# Patient Record
Sex: MALE | Race: WHITE | Employment: FULL TIME | URBAN - NONMETROPOLITAN AREA
[De-identification: names, ages, dates, MRNs, and addresses within clinical notes are randomized per-mention and may not be internally consistent; named-entity substitution may affect disease eponyms.]

---
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
## 2021-04-12 ENCOUNTER — HOSPITAL ENCOUNTER (OUTPATIENT)
Dept: GENERAL RADIOLOGY | Age: 21
Discharge: HOME | End: 2021-04-12
Payer: COMMERCIAL

## 2021-04-12 ENCOUNTER — HOSPITAL ENCOUNTER (OUTPATIENT)
Dept: MRI IMAGING | Age: 21
Discharge: HOME | End: 2021-04-12
Payer: COMMERCIAL

## 2021-04-12 DIAGNOSIS — M25.311 INSTABILITY OF RIGHT SHOULDER JOINT: ICD-10-CM

## 2021-04-12 PROCEDURE — 73222 MRI JOINT UPR EXTREM W/DYE: CPT

## 2021-04-12 PROCEDURE — 23350 INJECTION FOR SHOULDER X-RAY: CPT

## 2021-04-12 PROCEDURE — 6360000004 HC RX CONTRAST MEDICATION: Performed by: ORTHOPAEDIC SURGERY

## 2021-04-12 PROCEDURE — A9579 GAD-BASE MR CONTRAST NOS,1ML: HCPCS | Performed by: ORTHOPAEDIC SURGERY

## 2021-04-12 RX ADMIN — IOTHALAMATE MEGLUMINE 5 ML: 600 INJECTION INTRAVASCULAR at 09:34

## 2021-04-12 RX ADMIN — GADOTERIDOL 1 ML: 279.3 INJECTION, SOLUTION INTRAVENOUS at 10:51

## 2021-06-20 ENCOUNTER — HOSPITAL ENCOUNTER (EMERGENCY)
Age: 21
Discharge: HOME | End: 2021-06-20
Attending: EMERGENCY MEDICINE
Payer: COMMERCIAL

## 2021-06-20 ENCOUNTER — APPOINTMENT (OUTPATIENT)
Dept: GENERAL RADIOLOGY | Age: 21
End: 2021-06-20
Payer: COMMERCIAL

## 2021-06-20 VITALS
SYSTOLIC BLOOD PRESSURE: 103 MMHG | TEMPERATURE: 98 F | DIASTOLIC BLOOD PRESSURE: 65 MMHG | BODY MASS INDEX: 30.06 KG/M2 | HEART RATE: 75 BPM | OXYGEN SATURATION: 98 % | WEIGHT: 210 LBS | RESPIRATION RATE: 18 BRPM | HEIGHT: 70 IN

## 2021-06-20 DIAGNOSIS — S43.004A DISLOCATION OF RIGHT SHOULDER JOINT, INITIAL ENCOUNTER: Primary | ICD-10-CM

## 2021-06-20 PROCEDURE — 6360000002 HC RX W HCPCS: Performed by: PHYSICIAN ASSISTANT

## 2021-06-20 PROCEDURE — 96375 TX/PRO/DX INJ NEW DRUG ADDON: CPT

## 2021-06-20 PROCEDURE — 6360000002 HC RX W HCPCS

## 2021-06-20 PROCEDURE — 73030 X-RAY EXAM OF SHOULDER: CPT

## 2021-06-20 PROCEDURE — 23655 CLTX SHO DSLC W/MNPJ W/ANES: CPT

## 2021-06-20 PROCEDURE — 96374 THER/PROPH/DIAG INJ IV PUSH: CPT

## 2021-06-20 PROCEDURE — 99284 EMERGENCY DEPT VISIT MOD MDM: CPT

## 2021-06-20 RX ORDER — TRAMADOL HYDROCHLORIDE 50 MG/1
50 TABLET ORAL EVERY 6 HOURS PRN
Qty: 15 TABLET | Refills: 0 | Status: SHIPPED | OUTPATIENT
Start: 2021-06-20 | End: 2021-06-25

## 2021-06-20 RX ORDER — ONDANSETRON 2 MG/ML
4 INJECTION INTRAMUSCULAR; INTRAVENOUS ONCE
Status: COMPLETED | OUTPATIENT
Start: 2021-06-20 | End: 2021-06-20

## 2021-06-20 RX ORDER — PROPOFOL 10 MG/ML
INJECTION, EMULSION INTRAVENOUS
Status: COMPLETED
Start: 2021-06-20 | End: 2021-06-20

## 2021-06-20 RX ORDER — PROPOFOL 10 MG/ML
1 INJECTION, EMULSION INTRAVENOUS ONCE
Status: COMPLETED | OUTPATIENT
Start: 2021-06-20 | End: 2021-06-20

## 2021-06-20 RX ADMIN — PROPOFOL 95 MG: 10 INJECTION, EMULSION INTRAVENOUS at 02:08

## 2021-06-20 RX ADMIN — ONDANSETRON 4 MG: 2 INJECTION INTRAMUSCULAR; INTRAVENOUS at 01:09

## 2021-06-20 RX ADMIN — HYDROMORPHONE HYDROCHLORIDE 1 MG: 1 INJECTION, SOLUTION INTRAMUSCULAR; INTRAVENOUS; SUBCUTANEOUS at 01:09

## 2021-06-20 ASSESSMENT — PAIN SCALES - GENERAL
PAINLEVEL_OUTOF10: 5
PAINLEVEL_OUTOF10: 6

## 2021-06-20 ASSESSMENT — PAIN DESCRIPTION - ORIENTATION: ORIENTATION: RIGHT

## 2021-06-20 ASSESSMENT — PAIN DESCRIPTION - LOCATION: LOCATION: SHOULDER

## 2021-06-20 NOTE — ED PROVIDER NOTES
98 °F (36.7 °C). His blood pressure is 103/65 and his pulse is 75. His respiration is 18 and oxygen saturation is 98%. Physical Exam  Vitals and nursing note reviewed. Constitutional:       General: He is not in acute distress. Appearance: He is well-developed. He is not toxic-appearing or diaphoretic. HENT:      Head: Normocephalic and atraumatic. Right Ear: Hearing normal.      Left Ear: Hearing normal.      Nose: Nose normal.   Eyes:      General: Lids are normal.      Conjunctiva/sclera: Conjunctivae normal.   Neck:      Trachea: No tracheal deviation. Cardiovascular:      Rate and Rhythm: Normal rate and regular rhythm. Pulses:           Radial pulses are 2+ on the right side and 2+ on the left side. Pulmonary:      Effort: Pulmonary effort is normal. No tachypnea or respiratory distress. Breath sounds: No stridor. Abdominal:      General: There is no distension. Palpations: Abdomen is soft. Musculoskeletal:      Right shoulder: Deformity, tenderness and bony tenderness present. Decreased range of motion. Normal pulse. Cervical back: No rigidity. Skin:     General: Skin is warm and dry. Coloration: Skin is not pale. Findings: No abrasion, ecchymosis or rash. Neurological:      Mental Status: He is alert and oriented to person, place, and time. GCS: GCS eye subscore is 4. GCS verbal subscore is 5. GCS motor subscore is 6. Sensory: No sensory deficit. Gait: Gait normal.   Psychiatric:         Speech: Speech normal.         Behavior: Behavior normal.         Thought Content: Thought content normal.         DIFFERENTIAL DIAGNOSIS:   Including but not limited to:  Shoulder dislocation, fracture, separation    DIAGNOSTIC RESULTS     EKG: All [facility]'s [facility] who either signs or Co-signs this chart in the absence of a cardiologist.  None    RADIOLOGY: non-plain film images(s) such as CT,Ultrasound and MRI are read by the radiologist.  Plain radiographic images are visualized and preliminarily interpreted by the emergency physician unless otherwise stated below. XR SHOULDER RIGHT (MIN 2 VIEWS)   Final Result   Post reduction films demonstrate near-anatomic alignment of the right    humeral head in relation to the glenoid fossa. No fractures. This document has been electronically signed by: Ike Castillo DO on    06/20/2021 02:57 AM      XR SHOULDER RIGHT (MIN 2 VIEWS)   Final Result   Anterior inferior dislocation of the right humeral head. Nadyne Tigist Possible    Hill-Sachs deformity of the right humeral head. This document has been electronically signed by: Ike Castlilo DO on    06/20/2021 12:57 AM          LABS:   Labs Reviewed - No data to display    EMERGENCY DEPARTMENT COURSE:   Vitals:    Vitals:    06/20/21 0209 06/20/21 0212 06/20/21 0215 06/20/21 0220   BP: 118/84 110/63  103/65   Pulse: 69 72 70 75   Resp: 16 18 18 18   Temp:       TempSrc:       SpO2: 100% 97% 98% 98%   Weight:       Height:         MDM:  The patient was seen by me in the emergency department for shoulder dislocation. Physical exam revealed clinical anterior dislocation of the right shoulder. The patient was neurovascularly intact. Vital signs reviewed and noted stable. Old records were reviewed including MRI in April revealing labrum tear and Hill-Sachs deformity. Patient was medicated with Dilaudid and Zofran. I was unable to get him relaxed enough with IV medications to reduce the shoulder so conscious sedation was discussed. After myself and Dr. Padilla Class discussed risks and benefits of conscious sedation patient wished to proceed. Please refer to conscious sedation note. Patient tolerated the procedure well and shoulder dislocation was reduced. This was confirmed with x-rays. Patient recovered from conscious sedation without complication. Sling and swath was applied.   Patient was comfortable plan of discharge home and to follow-up at [facility]. I have given the patient strict written and verbal instructions about care at home, follow-up, and signs and symptoms of worsening of condition and they did verbalize understanding. CRITICAL CARE:   None    CONSULTS:  None    PROCEDURES:  Ortho Injury    Date/Time: 6/20/2021 2:00 AM  Performed by: Kathleen Rashid PA-C  Authorized by: Malcolm Nguyen MD   Injury location: shoulder  Location details: right shoulder  Injury type: dislocation  Dislocation type: anterior  Hill-Sachs deformity: yes  Chronicity: recurrent  Pre-procedure neurovascular assessment: neurovascularly intact  Pre-procedure distal perfusion: normal  Pre-procedure neurological function: normal  Pre-procedure range of motion: reduced    Anesthesia:  Local anesthesia used: no    Sedation:  Patient sedated: yes  Sedation type: moderate (conscious) sedation  Sedatives: propofol and see MAR for details  Analgesia: hydromorphone  Sedation start date/time: 6/20/2021 2:08 AM  Sedation end date/time: 6/20/2021 2:20 AM    Manipulation performed: yes  Reduction method: traction and counter traction  Reduction successful: yes  X-ray confirmed reduction: yes  Immobilization: sling (Sling and swath)  Post-procedure neurovascular assessment: post-procedure neurovascularly intact  Post-procedure distal perfusion: normal  Post-procedure neurological function: normal  Post-procedure range of motion: normal  Patient tolerance: patient tolerated the procedure well with no immediate complications  Comments: Conscious sedation and reduction performed by Dr. Shayy Carrington      1. Dislocation of right shoulder joint, initial encounter          DISPOSITION/PLAN     1.  Dislocation of right shoulder joint, initial encounter        PATIENT REFERRED TO:  Perfecto Yi [address] [place] [address]  [phone]  Schedule an appointment as soon as possible for a visit         DISCHARGE MEDICATIONS: Discharge Medication List as of 6/20/2021  2:39 AM      START taking these medications    Details   traMADol (ULTRAM) 50 MG tablet Take 1 tablet by mouth every 6 hours as needed for Pain for up to 5 days. , Disp-15 tablet, R-0Print             (Please note that portions of this note were completed with a voice recognition program.  Efforts were made to edit the dictations but occasionally words are mis-transcribed.)    Paul Youngblood PA-C 06/22/21 3:46 AM    TYLER Abrams Ace, PA-C  06/22/21 1980

## 2021-06-20 NOTE — PROGRESS NOTES
This RCP at bedside for conscious sedation. Patient SpO2 100% on 3Lpm nasal cannula, EtCO2 41, RR 20/min. Ambu bag prepared.

## 2021-06-20 NOTE — ED TRIAGE NOTES
Pt to ED through triage with c/c right shoulder pain. Pt states his shoulder frequently pops out of place. Pt states he was scratching his back about an hour ago when he felt it slide out of place. Pt rates pain 5/10 at this time. Vitals stable.

## 2021-06-20 NOTE — ED NOTES
Consent form signed by pt at this time.  Benefits and risks reviewed by [facility]     Melita Bonilla RN  06/20/21 8601

## 2021-06-20 NOTE — ED PROVIDER NOTES
Procedural sedation    Date/Time: 6/20/2021 2:54 AM  Performed by: Abisai Valdez MD  Authorized by: Abisai Valdez MD     Consent:     Consent obtained:  Written    Consent given by:  Patient    Risks discussed:  Dysrhythmia, nausea, prolonged hypoxia resulting in organ damage, respiratory compromise necessitating ventilatory assistance and intubation and vomiting  Indications:     Procedure performed:  Dislocation reduction    Procedure necessitating sedation performed by:  Physician performing sedation    Intended level of sedation:  Moderate (conscious sedation)  Pre-sedation assessment:     ASA classification: class 1 - normal, healthy patient      Neck mobility: normal      Mouth opening:  3 or more finger widths    Thyromental distance:  3 finger widths    Mallampati score:  I - soft palate, uvula, fauces, pillars visible    Pre-sedation assessments completed and reviewed: airway patency, cardiovascular function and respiratory function      History of difficult intubation: no    Immediate pre-procedure details:     Reviewed: vital signs      Verified: bag valve mask available, emergency equipment available, intubation equipment available, IV patency confirmed, oxygen available and suction available    Procedure details (see MAR for exact dosages):     Preoxygenation:  Nasal cannula    Sedation:  Propofol    Intra-procedure monitoring:  Blood pressure monitoring, cardiac monitor, continuous capnometry, continuous pulse oximetry, frequent LOC assessments and frequent vital sign checks    Intra-procedure events: none    Post-procedure details:     Attendance: Constant attendance by certified staff until patient recovered      Complications:  None    Post-sedation assessments completed and reviewed: airway patency, mental status and respiratory function    Comments: Tolerated conscious sedation without complication.            Abisai Valdez MD  06/20/21 1225

## 2021-06-20 NOTE — ED NOTES
Dr. Neeraj Piper believes that shoulder is successfully reduced.  XR ordered to confirm     Malissa Cardona RN  06/20/21 9247

## 2021-06-22 ASSESSMENT — ENCOUNTER SYMPTOMS
BACK PAIN: 0
SHORTNESS OF BREATH: 0
PHOTOPHOBIA: 0
RHINORRHEA: 0
NAUSEA: 0
VOMITING: 0

## 2022-01-15 ENCOUNTER — HOSPITAL ENCOUNTER (EMERGENCY)
Age: 22
Discharge: HOME | End: 2022-01-15
Payer: COMMERCIAL

## 2022-01-15 VITALS
SYSTOLIC BLOOD PRESSURE: 136 MMHG | TEMPERATURE: 98.7 F | HEIGHT: 70 IN | RESPIRATION RATE: 18 BRPM | WEIGHT: 220 LBS | DIASTOLIC BLOOD PRESSURE: 71 MMHG | HEART RATE: 97 BPM | OXYGEN SATURATION: 97 % | BODY MASS INDEX: 31.5 KG/M2

## 2022-01-15 DIAGNOSIS — U07.1 COVID-19: Primary | ICD-10-CM

## 2022-01-15 LAB — SARS-COV-2, NAA: DETECTED

## 2022-01-15 PROCEDURE — G0463 HOSPITAL OUTPT CLINIC VISIT: HCPCS

## 2022-01-15 PROCEDURE — 87635 SARS-COV-2 COVID-19 AMP PRB: CPT

## 2022-01-15 PROCEDURE — 99213 OFFICE O/P EST LOW 20 MIN: CPT

## 2022-01-15 PROCEDURE — 99213 OFFICE O/P EST LOW 20 MIN: CPT | Performed by: NURSE PRACTITIONER

## 2022-01-15 ASSESSMENT — PAIN DESCRIPTION - PAIN TYPE: TYPE: ACUTE PAIN

## 2022-01-15 ASSESSMENT — ENCOUNTER SYMPTOMS
SORE THROAT: 1
EYE DISCHARGE: 0
WHEEZING: 0
NAUSEA: 0
DIARRHEA: 0
EYE REDNESS: 0
VOMITING: 0
COUGH: 1
SHORTNESS OF BREATH: 0
ALLERGIC/IMMUNOLOGIC NEGATIVE: 1
BACK PAIN: 0
TROUBLE SWALLOWING: 0
RHINORRHEA: 0
EYE PAIN: 0
CONSTIPATION: 0
ABDOMINAL PAIN: 0

## 2022-01-15 ASSESSMENT — PAIN DESCRIPTION - PROGRESSION: CLINICAL_PROGRESSION: GRADUALLY WORSENING

## 2022-01-15 ASSESSMENT — PAIN DESCRIPTION - ONSET: ONSET: GRADUAL

## 2022-01-15 ASSESSMENT — PAIN DESCRIPTION - DESCRIPTORS: DESCRIPTORS: ACHING;SORE

## 2022-01-15 ASSESSMENT — PAIN - FUNCTIONAL ASSESSMENT: PAIN_FUNCTIONAL_ASSESSMENT: PREVENTS OR INTERFERES WITH MANY ACTIVE NOT PASSIVE ACTIVITIES

## 2022-01-15 ASSESSMENT — PAIN SCALES - GENERAL: PAINLEVEL_OUTOF10: 5

## 2022-01-15 ASSESSMENT — PAIN DESCRIPTION - LOCATION: LOCATION: GENERALIZED;THROAT

## 2022-01-15 NOTE — ED NOTES
Covid nasal pharyngeal swab obtained and sent to lab. Pt tolerated well.      Yeny Alexander RN  01/15/22 8987

## 2022-01-15 NOTE — Clinical Note
Fanta Dino was seen and treated in our emergency department on 1/15/2022. Patient was seen in our urgent care today and was diagnosed with a COVID-19 virus infection.     JOSE MANUEL Diaz - CNP
1.71

## 2022-01-16 NOTE — ED NOTES
No change in patients condition. Discharge instructions discussed with pt and pt verbalized understanding of info given. Pt left stable and ambulated to exit.        Laquita Bloom RN  01/15/22 1912

## 2022-01-16 NOTE — ED PROVIDER NOTES
[address]  Urgent Care Encounter      CHIEF COMPLAINT       Chief Complaint   Patient presents with    Concern For COVID-19     EXPOSED AND SX STARTED 2 DAYS AGO        Nurses Notes reviewed and I agree except as noted in the HPI. HISTORY OF PRESENT ILLNESS   Darek Moseley is a 24 y.o. male who presents with 2 days of upper respiratory infection symptoms after being exposed directly to Leana in his house. REVIEW OF SYSTEMS     Review of Systems   Constitutional: Positive for fatigue. Negative for activity change and fever. HENT: Positive for congestion and sore throat. Negative for ear pain, rhinorrhea and trouble swallowing. Eyes: Negative for pain, discharge and redness. Respiratory: Positive for cough. Negative for shortness of breath and wheezing. Cardiovascular: Negative. Gastrointestinal: Negative for abdominal pain, constipation, diarrhea, nausea and vomiting. Endocrine: Negative. Genitourinary: Negative for dysuria, frequency and urgency. Musculoskeletal: Positive for myalgias. Negative for arthralgias and back pain. Skin: Negative for rash. Allergic/Immunologic: Negative. Neurological: Positive for headaches. Negative for dizziness, tremors and weakness. Hematological: Negative. Psychiatric/Behavioral: Negative for dysphoric mood and sleep disturbance. The patient is not nervous/anxious. PAST MEDICAL HISTORY   History reviewed. No pertinent past medical history. SURGICAL HISTORY     Patient  has a past surgical history that includes Eye surgery and hernia repair (Left, 1/18/2019). CURRENT MEDICATIONS       There are no discharge medications for this patient. ALLERGIES     Patient is has No Known Allergies. FAMILY HISTORY     Patient'sfamily history includes No Known Problems in his father and mother. SOCIAL HISTORY     Patient  reports that he is a non-smoker but has been exposed to tobacco smoke.  He has never used smokeless tobacco. He reports that he does not drink alcohol and does not use drugs. PHYSICAL EXAM     ED TRIAGE VITALS  BP: 136/71, Temp: 98.7 °F (37.1 °C), Pulse: 97, Resp: 18, SpO2: 97 %  Physical Exam  Vitals reviewed. Constitutional:       General: He is not in acute distress. Appearance: Normal appearance. He is well-developed. He is ill-appearing. He is not toxic-appearing or diaphoretic. HENT:      Head: Normocephalic. No right periorbital erythema or left periorbital erythema. Jaw: No trismus. Right Ear: Hearing, tympanic membrane, ear canal and external ear normal.      Left Ear: Hearing, tympanic membrane, ear canal and external ear normal.      Nose: Congestion present. No mucosal edema or rhinorrhea. Mouth/Throat:      Mouth: Mucous membranes are moist.      Dentition: Normal dentition. Pharynx: Uvula midline. No posterior oropharyngeal erythema. Tonsils: No tonsillar exudate. 0 on the right. 0 on the left. Eyes:      General: Lids are normal.         Right eye: No discharge. Left eye: No discharge. Conjunctiva/sclera: Conjunctivae normal.      Right eye: Right conjunctiva is not injected. No chemosis. Left eye: No chemosis. Pupils: Pupils are equal, round, and reactive to light. Neck:      Vascular: No JVD. Trachea: Trachea normal.   Cardiovascular:      Rate and Rhythm: Normal rate and regular rhythm. Heart sounds: Normal heart sounds. No murmur heard. Pulmonary:      Effort: Pulmonary effort is normal. No respiratory distress. Breath sounds: Normal breath sounds. No stridor. No wheezing. Musculoskeletal:         General: No tenderness or signs of injury. Normal range of motion. Cervical back: Full passive range of motion without pain and normal range of motion. Lymphadenopathy:      Cervical: No cervical adenopathy. Skin:     General: Skin is warm and dry.       Capillary Refill: Capillary refill takes less than 2 seconds. Findings: No rash. Neurological:      Mental Status: He is alert and oriented to person, place, and time. GCS: GCS eye subscore is 4. GCS verbal subscore is 5. GCS motor subscore is 6. Cranial Nerves: No cranial nerve deficit. Coordination: Coordination normal.      Gait: Gait normal.   Psychiatric:         Mood and Affect: Mood is not anxious or depressed. Speech: Speech normal.         Behavior: Behavior normal. Behavior is not withdrawn or hyperactive. Behavior is cooperative. Thought Content: Thought content normal.         Judgment: Judgment normal.         DIAGNOSTIC RESULTS   Labs:   Results for orders placed or performed during the hospital encounter of 01/15/22   COVID-19, Rapid   Result Value Ref Range    SARS-CoV-2, LUCA DETECTED (AA) NOT DETECTED       IMAGING:    URGENT CARE COURSE:     Vitals:    01/15/22 1839   BP: 136/71   Pulse: 97   Resp: 18   Temp: 98.7 °F (37.1 °C)   TempSrc: Temporal   SpO2: 97%   Weight: 220 lb (99.8 kg)   Height: 5' 10\" (1.778 m)       Medications - No data to display  PROCEDURES:  None  FINAL IMPRESSION      1. COVID-19        DISPOSITION/PLAN   DISPOSITION Decision To Discharge 01/15/2022 07:09:13 PM    Rapid COVID test positive. PATIENT REFERRED TO:  Mundo Chavez, Les[facility]  [phone]      As needed    DISCHARGE MEDICATIONS:  There are no discharge medications for this patient. There are no discharge medications for this patient.       JOSE MANUEL Jessica - JOSE MANUEL Jacome CNP  01/15/22 3684

## 2023-05-11 ENCOUNTER — HOSPITAL ENCOUNTER (EMERGENCY)
Age: 23
Discharge: HOME | End: 2023-05-11
Payer: COMMERCIAL

## 2023-05-11 VITALS
DIASTOLIC BLOOD PRESSURE: 75 MMHG | SYSTOLIC BLOOD PRESSURE: 121 MMHG | OXYGEN SATURATION: 98 % | RESPIRATION RATE: 16 BRPM | TEMPERATURE: 98.3 F | HEART RATE: 105 BPM

## 2023-05-11 DIAGNOSIS — J02.9 ACUTE PHARYNGITIS, UNSPECIFIED ETIOLOGY: Primary | ICD-10-CM

## 2023-05-11 DIAGNOSIS — J30.9 ALLERGIC RHINITIS, UNSPECIFIED SEASONALITY, UNSPECIFIED TRIGGER: ICD-10-CM

## 2023-05-11 LAB — S PYO AG THROAT QL: NEGATIVE

## 2023-05-11 PROCEDURE — 99213 OFFICE O/P EST LOW 20 MIN: CPT

## 2023-05-11 PROCEDURE — 87651 STREP A DNA AMP PROBE: CPT

## 2023-05-11 PROCEDURE — 99213 OFFICE O/P EST LOW 20 MIN: CPT | Performed by: EMERGENCY MEDICINE

## 2023-05-11 RX ORDER — CETIRIZINE HYDROCHLORIDE 10 MG/1
10 TABLET ORAL DAILY
COMMUNITY

## 2023-05-11 RX ORDER — LORATADINE AND PSEUDOEPHEDRINE SULFATE 10; 240 MG/1; MG/1
1 TABLET, EXTENDED RELEASE ORAL DAILY
Qty: 10 TABLET | Refills: 0 | Status: SHIPPED | OUTPATIENT
Start: 2023-05-11

## 2023-05-11 RX ORDER — FLUTICASONE PROPIONATE 50 MCG
1 SPRAY, SUSPENSION (ML) NASAL DAILY
Qty: 32 G | Refills: 1 | Status: SHIPPED | OUTPATIENT
Start: 2023-05-11

## 2023-05-11 ASSESSMENT — PAIN DESCRIPTION - PAIN TYPE: TYPE: ACUTE PAIN

## 2023-05-11 ASSESSMENT — ENCOUNTER SYMPTOMS
COUGH: 0
ABDOMINAL PAIN: 0
SHORTNESS OF BREATH: 0
SORE THROAT: 1
SINUS PRESSURE: 1

## 2023-05-11 ASSESSMENT — PAIN SCALES - GENERAL: PAINLEVEL_OUTOF10: 4

## 2023-05-11 ASSESSMENT — PAIN DESCRIPTION - FREQUENCY: FREQUENCY: CONTINUOUS

## 2023-05-11 ASSESSMENT — PAIN - FUNCTIONAL ASSESSMENT: PAIN_FUNCTIONAL_ASSESSMENT: 0-10

## 2023-05-11 ASSESSMENT — PAIN DESCRIPTION - LOCATION: LOCATION: THROAT

## 2023-05-11 NOTE — ED PROVIDER NOTES
[address]  Urgent Care Encounter       CHIEF COMPLAINT       Chief Complaint   Patient presents with    Pharyngitis    Sinusitis       Nurses Notes reviewed and I agree except as noted in the HPI. HISTORY OF PRESENT ILLNESS   Cortney Farfan. is a 25 y.o. male who presents for complaints of sore throat, sinus drainage, postnasal drip, sinus pressure. Symptoms x3 days. Patient started taking Zyrtec with minimal improvement of symptoms. Sore throat began 2 days ago. States it is painful when he talks. No fever. No body aches or chills. 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 He thinks the hot and cold exposure causes some of his symptoms. HPI    REVIEW OF SYSTEMS     Review of Systems   Constitutional:  Negative for activity change, fatigue and fever. HENT:  Positive for congestion, sinus pressure and sore throat. Respiratory:  Negative for cough and shortness of breath. Cardiovascular:  Negative for chest pain. Gastrointestinal:  Negative for abdominal pain. Neurological:  Negative for dizziness and headaches. PAST MEDICAL HISTORY   History reviewed. No pertinent past medical history. SURGICALHISTORY     Patient  has a past surgical history that includes Eye surgery; hernia repair (Left, 01/18/2019); and Shoulder arthroscopy w/ labral repair (Right, 2021). CURRENT MEDICATIONS       Previous Medications    CETIRIZINE (ZYRTEC) 10 MG TABLET    Take 1 tablet by mouth daily       ALLERGIES     Patient is has No Known Allergies.     Patients   Immunization History   Administered Date(s) Administered    DTaP 09/28/2006    DTaP vaccine 2000, 2000, 02/22/2001, 10/19/2004    HPV, GARDASIL 9, (age 6y-42y), IM, 0.5mL 09/04/2018    Hep A, HAVRIX, VAQTA, (age 16m-22y), IM, 0.5mL 09/04/2018    Hep B, ENGERIX-B, RECOMBIVAX-HB, (age Birth - 22y), IM, 0.5mL 2000    Hep B/Hib (Comvax) 2000,

## 2023-05-11 NOTE — DISCHARGE INSTRUCTIONS
Flonase daily as directed    Claritin-D rather than your Zyrtec daily to see if this helps with your symptoms    Salt water gargles will help with the sore throat    Return for new or worsening symptoms or if no improvement in 5 days

## 2024-05-16 NOTE — ED NOTES
Patient discharge instructions given to pt and pt verbalized understanding, 2 px given, no other needs at this time, and pt left in stable condition.       Harvey Moran RN  05/11/23 5188 2 seconds or less 2 seconds or less

## 2024-07-23 ENCOUNTER — HOSPITAL ENCOUNTER (EMERGENCY)
Age: 24
Discharge: HOME | End: 2024-07-23

## 2024-07-23 VITALS
BODY MASS INDEX: 31.5 KG/M2 | SYSTOLIC BLOOD PRESSURE: 134 MMHG | TEMPERATURE: 99.2 F | HEART RATE: 85 BPM | DIASTOLIC BLOOD PRESSURE: 80 MMHG | HEIGHT: 70 IN | RESPIRATION RATE: 16 BRPM | WEIGHT: 220 LBS | OXYGEN SATURATION: 96 %

## 2024-07-23 DIAGNOSIS — Z20.2 EXPOSURE TO CHLAMYDIA: Primary | ICD-10-CM

## 2024-07-23 PROCEDURE — 87491 CHLMYD TRACH DNA AMP PROBE: CPT

## 2024-07-23 PROCEDURE — 99213 OFFICE O/P EST LOW 20 MIN: CPT | Performed by: NURSE PRACTITIONER

## 2024-07-23 PROCEDURE — 87591 N.GONORRHOEAE DNA AMP PROB: CPT

## 2024-07-23 PROCEDURE — 99213 OFFICE O/P EST LOW 20 MIN: CPT

## 2024-07-23 ASSESSMENT — ENCOUNTER SYMPTOMS
SORE THROAT: 0
COUGH: 0
CHEST TIGHTNESS: 0
RHINORRHEA: 0
DIARRHEA: 0
NAUSEA: 0
VOMITING: 0
SHORTNESS OF BREATH: 0

## 2024-07-23 NOTE — ED TRIAGE NOTES
Arrives to [facility] for the evaluation of STD exposure and would like tested.  Received a call from the [facility] Dept this morning that he was exposed to Chlamydia.  States his ex was tested and positive.  Patient denies dysuria, penile discharge, testicular pain, fever, chills, lower abdominal pain.  Instructed patient to provide a urine specimen for testing.  Waiting  provider to assess.

## 2024-07-23 NOTE — ED PROVIDER NOTES
[facility]  Urgent Care Encounter       CHIEF COMPLAINT       Chief Complaint   Patient presents with    Exposure to STD     Ex tested Positive for Chlamydia- Patient was notified by [facility] Dept today   Asymptomatic        Nurses Notes reviewed and I agree except as noted in the HPI.  HISTORY OF PRESENT ILLNESS   Eulalio Mancuso Jr. is a 23 y.o. male who presents to the [facility] for evaluation of exposure to chlamydia.  He reports that he was notified by the [facility] department of his ex-girlfriend's positive chlamydia result.  He denies symptoms at this time.  Denies other sexual partners.    The history is provided by the patient. No  was used.       REVIEW OF SYSTEMS     Review of Systems   Constitutional:  Negative for activity change, appetite change, chills, fatigue and fever.   HENT:  Negative for ear discharge, ear pain, rhinorrhea and sore throat.    Respiratory:  Negative for cough, chest tightness and shortness of breath.    Cardiovascular:  Negative for chest pain.   Gastrointestinal:  Negative for diarrhea, nausea and vomiting.   Genitourinary:  Negative for dysuria.   Skin:  Negative for rash.   Allergic/Immunologic: Negative for environmental allergies and food allergies.   Neurological:  Negative for dizziness and headaches.       PAST MEDICAL HISTORY   History reviewed. No pertinent past medical history.    SURGICALHISTORY     Patient  has a past surgical history that includes Eye surgery; hernia repair (Left, 01/18/2019); and Shoulder arthroscopy w/ labral repair (Right, 2021).    CURRENT MEDICATIONS       Discharge Medication List as of 7/23/2024  5:43 PM        CONTINUE these medications which have NOT CHANGED    Details   cetirizine (ZYRTEC) 10 MG tablet Take 1 tablet by mouth dailyHistorical Med      loratadine-pseudoephedrine (CLARITIN-D 24 HOUR)  MG per extended release tablet Take 1 tablet by mouth

## 2024-07-25 LAB
CHLAMYDIA DNA UR QL NAA+PROBE: NEGATIVE
CHLAMYDIA, GC DNA AMP, URINE: NORMAL
N GONORRHOEA DNA UR QL NAA+PROBE: NEGATIVE

## 2025-06-24 ENCOUNTER — HOSPITAL ENCOUNTER (EMERGENCY)
Age: 25
Discharge: HOME | End: 2025-06-24
Payer: COMMERCIAL

## 2025-06-24 ENCOUNTER — APPOINTMENT (OUTPATIENT)
Dept: GENERAL RADIOLOGY | Age: 25
End: 2025-06-24
Payer: COMMERCIAL

## 2025-06-24 VITALS
OXYGEN SATURATION: 97 % | RESPIRATION RATE: 16 BRPM | DIASTOLIC BLOOD PRESSURE: 74 MMHG | TEMPERATURE: 97.8 F | WEIGHT: 220 LBS | HEART RATE: 82 BPM | HEIGHT: 70 IN | BODY MASS INDEX: 31.5 KG/M2 | SYSTOLIC BLOOD PRESSURE: 114 MMHG

## 2025-06-24 DIAGNOSIS — S99.911A ANKLE INJURY, RIGHT, INITIAL ENCOUNTER: Primary | ICD-10-CM

## 2025-06-24 PROCEDURE — 99213 OFFICE O/P EST LOW 20 MIN: CPT | Performed by: NURSE PRACTITIONER

## 2025-06-24 PROCEDURE — 73610 X-RAY EXAM OF ANKLE: CPT

## 2025-06-24 PROCEDURE — 99213 OFFICE O/P EST LOW 20 MIN: CPT

## 2025-06-24 RX ORDER — MENTHOL 787.5 MG/1
PATCH TOPICAL
COMMUNITY
Start: 2025-06-24

## 2025-06-24 RX ORDER — ACETAMINOPHEN 500 MG
1000 TABLET ORAL EVERY 6 HOURS PRN
COMMUNITY

## 2025-06-24 RX ORDER — IBUPROFEN 400 MG/1
400 TABLET, FILM COATED ORAL EVERY 6 HOURS PRN
Qty: 120 TABLET | Refills: 0 | Status: SHIPPED | OUTPATIENT
Start: 2025-06-24

## 2025-06-24 ASSESSMENT — ENCOUNTER SYMPTOMS
WHEEZING: 0
COLOR CHANGE: 0
SHORTNESS OF BREATH: 0
BACK PAIN: 0
COUGH: 0
CHOKING: 0
APNEA: 0
CHEST TIGHTNESS: 0
STRIDOR: 0

## 2025-06-24 ASSESSMENT — PAIN DESCRIPTION - ORIENTATION: ORIENTATION: RIGHT

## 2025-06-24 ASSESSMENT — PAIN DESCRIPTION - PAIN TYPE: TYPE: ACUTE PAIN

## 2025-06-24 ASSESSMENT — PAIN DESCRIPTION - FREQUENCY: FREQUENCY: CONTINUOUS

## 2025-06-24 ASSESSMENT — PAIN - FUNCTIONAL ASSESSMENT
PAIN_FUNCTIONAL_ASSESSMENT: 0-10
PAIN_FUNCTIONAL_ASSESSMENT: ACTIVITIES ARE NOT PREVENTED

## 2025-06-24 ASSESSMENT — PAIN DESCRIPTION - LOCATION: LOCATION: ANKLE

## 2025-06-24 ASSESSMENT — PAIN SCALES - GENERAL: PAINLEVEL_OUTOF10: 3

## 2025-06-24 ASSESSMENT — PAIN DESCRIPTION - ONSET: ONSET: SUDDEN

## 2025-06-24 ASSESSMENT — PAIN DESCRIPTION - DESCRIPTORS: DESCRIPTORS: DISCOMFORT

## 2025-06-24 NOTE — ED PROVIDER NOTES
[facility] EMERGENCY DEPARTMENT  Urgent Care Encounter      CHIEF COMPLAINT       Chief Complaint   Patient presents with    Ankle Injury     Right- Painful, Swollen        Nurses Notes reviewed and I agree except as noted in the HPI.  HISTORY OFPRESENT ILLNESS   Eulalio Mancuso Jr. is a 24 y.o.  The history is provided by the patient. No  was used.   Ankle Problem  Location:  Ankle  Time since incident:  4 hours  Injury: yes    Mechanism of injury: crush    Crush:     Mechanism:  Falling object (he stepped into a whole at work when getting off a machine)  Ankle location:  R ankle  Pain details:     Quality:  Pressure and throbbing    Radiates to:  Does not radiate    Severity:  Severe    Onset quality:  Sudden    Timing:  Constant    Progression:  Unchanged  Chronicity:  New  Dislocation: no    Foreign body present:  No foreign bodies  Tetanus status:  Unknown  Prior injury to area:  No  Relieved by:  Nothing  Worsened by:  Bearing weight, flexion and extension  Ineffective treatments:  None tried  Associated symptoms: stiffness and swelling    Associated symptoms: no back pain, no decreased ROM, no fatigue, no fever, no itching, no muscle weakness, no neck pain, no numbness and no tingling    Risk factors: no concern for non-accidental trauma, no frequent fractures, no known bone disorder, no obesity and no recent illness        REVIEW OF SYSTEMS     Review of Systems   Constitutional:  Negative for activity change, appetite change, chills, diaphoresis, fatigue and fever.   Respiratory:  Negative for apnea, cough, choking, chest tightness, shortness of breath, wheezing and stridor.    Cardiovascular:  Negative for chest pain, palpitations and leg swelling.   Musculoskeletal:  Positive for gait problem, joint swelling, myalgias and stiffness. Negative for back pain and neck pain.   Skin:  Negative for color change, itching, pallor, rash and wound.       PAST MEDICAL

## 2025-06-24 NOTE — ED NOTES
Urine drug screen and BAT completed.  Both resulted negative.      Glenna Muñoz, RN  06/24/25 8453

## 2025-06-24 NOTE — DISCHARGE INSTRUCTIONS
Compression with Air Cast  Ice, elevation 15-20 minutes 3 times a day for the first 24-48 hours followed with heat 15-20 minutes 3 times a day thereafter.    Activity as tolerated.    Take medication as directed  Return for worsening symptoms, otherwise if symptoms persist follow up orthopedics in 1 to 3 days

## 2025-06-24 NOTE — ED TRIAGE NOTES
Arrives to [facility] for the evaluation of right ankle injury that occurred today while at work around 1400.  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  Felt a pop.  Did go home, iced, elevated and took 2 Tylenol.   Has swelling and pain in the right ankle.  Denies numbness or tingling.  Is limited weightbearing.  Plan for Xray right ankle.      Is filing Workman's Comp.  Requires BAT and Urine drug screen.

## 2025-06-25 ENCOUNTER — HOSPITAL ENCOUNTER (EMERGENCY)
Age: 25
Discharge: HOME | End: 2025-06-25
Attending: EMERGENCY MEDICINE
Payer: COMMERCIAL

## 2025-06-25 ENCOUNTER — APPOINTMENT (OUTPATIENT)
Dept: GENERAL RADIOLOGY | Age: 25
End: 2025-06-25
Payer: COMMERCIAL

## 2025-06-25 VITALS
HEART RATE: 110 BPM | BODY MASS INDEX: 31.5 KG/M2 | TEMPERATURE: 97.7 F | SYSTOLIC BLOOD PRESSURE: 122 MMHG | HEIGHT: 70 IN | OXYGEN SATURATION: 99 % | DIASTOLIC BLOOD PRESSURE: 95 MMHG | WEIGHT: 220 LBS | RESPIRATION RATE: 20 BRPM

## 2025-06-25 DIAGNOSIS — S93.401A SPRAIN OF RIGHT ANKLE, UNSPECIFIED LIGAMENT, INITIAL ENCOUNTER: Primary | ICD-10-CM

## 2025-06-25 PROCEDURE — 6370000000 HC RX 637 (ALT 250 FOR IP): Performed by: EMERGENCY MEDICINE

## 2025-06-25 PROCEDURE — 73630 X-RAY EXAM OF FOOT: CPT

## 2025-06-25 PROCEDURE — 99283 EMERGENCY DEPT VISIT LOW MDM: CPT

## 2025-06-25 RX ORDER — ACETAMINOPHEN 325 MG/1
650 TABLET ORAL ONCE
Status: COMPLETED | OUTPATIENT
Start: 2025-06-25 | End: 2025-06-25

## 2025-06-25 RX ADMIN — ACETAMINOPHEN 650 MG: 325 TABLET ORAL at 18:05

## 2025-06-25 NOTE — DISCHARGE INSTR - COC
Continuity of Care Form    Patient Name: Eulalio Mancuso Jr.   :  2000  MRN:  [id number]    Admit date:  2025  Discharge date:  ***    Code Status Order: No Order   Advance Directives:     Admitting Physician:  No admitting provider for patient encounter.  PCP: Diogo Mendez MD    Discharging Nurse: ***  Discharging Hospital Unit/Room#: [facility]  Discharging Unit Phone Number: ***    Emergency Contact:   Extended Emergency Contact Information  Primary Emergency Contact: Pa Mancuso  Address: [address]  Home Phone: [phone]  Mobile Phone: [phone]  Relation: Parent  Secondary Emergency Contact: Chi Mancuso Sr.  Address: [address]  Home Phone: [phone]  Relation: Parent    Past Surgical History:  Past Surgical History:   Procedure Laterality Date    EYE SURGERY      x2, unsure of which eye    HERNIA REPAIR Left 2019    ROBOTIC ASSISTED LEFT INGUINAL HERNIA REPAIR WITH MESH performed by Francisco Foster MD at [facility] OR    SHOULDER ARTHROSCOPY W/ LABRAL REPAIR Right        Immunization History:   Immunization History   Administered Date(s) Administered    DTaP 2006    DTaP vaccine 2000, 2000, 2001, 10/19/2004    HPV, GARDASIL 9, (age 9y-45y), IM, 0.5mL 2018    Hep A, HAVRIX, VAQTA, (age 12m-18y), IM, 0.5mL 2018    Hep B, ENGERIX-B, RECOMBIVAX-HB, (age Birth - 19y), IM, 0.5mL 2000    Hep B/Hib (Comvax) 2000, 2001    Hib (HbOC) 10/19/2004    Hib vaccine 2000    MMR, PRIORIX, M-M-R II, (age 12m+), SC, 0.5mL 10/19/2004, 2006    Meningococcal ACWY, MENVEO (MenACWY-CRM), (age 2m-55y), IM, 0.5mL 2018    Meningococcal B, BEXSERO, (age 10y-25y), IM, 0.5mL 2018    Poliovirus, IPOL, (age 6w+), SC/IM, 0.5mL 2000, 2000, 10/19/2004, 2006    TDaP, ADACEL (age 10y-64y),

## 2025-06-25 NOTE — ED NOTES
Patient to ED for ankle pain. Patient states he rolled his ankle at work yesterday and went to ambulatory care for evaluation. Workers comp completed at ambulatory care per patient.  Patient was told he had a sprain and given today off. Patient states he would like to be re-evaluated due to not being able to bear weight on his foot.

## 2025-06-25 NOTE — ED PROVIDER NOTES
My Independent interpretations:     EKG:      None    Imaging: Nonacute x-ray of the foot    Labs:      None                 Decision Rules/Clinical Scores utilized:              External Documentation Reviewed:         Previous patient encounter documents & history available on EMR was reviewed              See Formal Diagnostic Results above for the lab and radiology tests and orders.    3)  Treatment and Disposition         ED Reassessment: Pain better after Tylenol         Case discussed with consulting clinician: None         Shared Decision-Making was performed and disposition discussed with the        Patient/Family and questions answered          Social determinants of health impacting treatment or disposition:           Code Status:  Full      Summary of Patient Presentation:      MDM  / Patient presenting with complaint of right foot pain after work injury, no acute fracture, discharged home on crutches and occupational health follow-up tomorrow  Vitals Reviewed:    Vitals:    06/25/25 1651 06/25/25 1656   BP: (!) 122/95    Pulse: (!) 110    Resp: 20    Temp:  97.7 °F (36.5 °C)   TempSrc:  Oral   SpO2: 99%    Weight: 99.8 kg (220 lb)    Height: 1.778 m (5' 10\")        The patient was seen and examined. Appropriate diagnostic testing was performed and results reviewed with the patient.      The results of pertinent diagnostic studies and exam findings were discussed. The patient’s provisional diagnosis and plan of care were discussed with the patient and present family who expressed understanding. Any medications were reviewed and indications and risks of medications were discussed with the patient /family present. Strict verbal and written return precautions, instructions and appropriate follow-up provided to  the patient .     ED Medications administered this visit:  (None if blank)  Medications   acetaminophen (TYLENOL) tablet 650 mg (650 mg Oral Given 6/25/25 1805)               DIAGNOSTIC RESULTS

## (undated) DEVICE — COLUMN DRAPE

## (undated) DEVICE — GLOVE ORANGE PI 8   MSG9080

## (undated) DEVICE — BAG SPEC REM 224ML W4XL6IN DIA10MM 1 HND GYN DISP ENDOPCH

## (undated) DEVICE — YANKAUER,BULB TIP,W/O VENT,RIGID,STERILE: Brand: MEDLINE

## (undated) DEVICE — ARM DRAPE

## (undated) DEVICE — [HIGH FLOW INSUFFLATOR,  DO NOT USE IF PACKAGE IS DAMAGED,  KEEP DRY,  KEEP AWAY FROM SUNLIGHT,  PROTECT FROM HEAT AND RADIOACTIVE SOURCES.]: Brand: PNEUMOSURE

## (undated) DEVICE — BANDAGE ADH W1XL3IN NAT FAB WVN FLX DURABLE N ADH PD SEAL

## (undated) DEVICE — GLOVE ORANGE PI 7   MSG9070

## (undated) DEVICE — 35 ML SYRINGE LUER-LOCK TIP: Brand: MONOJECT

## (undated) DEVICE — CANNULA SEAL

## (undated) DEVICE — TUBING, SUCTION, 1/4" X 20', STRAIGHT: Brand: MEDLINE INDUSTRIES, INC.

## (undated) DEVICE — BLADE CLIPPER GEN PURP NS

## (undated) DEVICE — BLADELESS OBTURATOR: Brand: WECK VISTA

## (undated) DEVICE — SUTURE V-LOC 180 SZ 2-0 L9IN ABSRB GRN L27MM GS-22 1/2 CIR VLOCL2145

## (undated) DEVICE — UNDERPAD HOSP W30XL36IN WHT SUP ABSRB POLYMER AIR PERM DISP

## (undated) DEVICE — BINDER ABD UNISX 4 PNL PREM 6INX6INX12IN L XL 4

## (undated) DEVICE — GENERAL LAPAROSCOPY PACK-LF: Brand: MEDLINE INDUSTRIES, INC.

## (undated) DEVICE — GOWN,SIRUS,NONRNF,SETINSLV,XL,20/CS: Brand: MEDLINE

## (undated) DEVICE — TIP COVER ACCESSORY

## (undated) DEVICE — SOLUTION ANTIFOG VIS SYS CLEARIFY LAPSCP

## (undated) DEVICE — INTENDED FOR TISSUE SEPARATION, AND OTHER PROCEDURES THAT REQUIRE A SHARP SURGICAL BLADE TO PUNCTURE OR CUT.: Brand: BARD-PARKER ® CARBON RIB-BACK BLADES

## (undated) DEVICE — CYSTO PACK: Brand: MEDLINE INDUSTRIES, INC.

## (undated) DEVICE — ELECTRO LUBE IS A SINGLE PATIENT USE DEVICE THAT IS INTENDED TO BE USED ON ELECTROSURGICAL ELECTRODES TO REDUCE STICKING.: Brand: KEY SURGICAL ELECTRO LUBE

## (undated) DEVICE — NEEDLE INSUF L120MM DIA2MM DISP FOR PNEUMOPERI ENDOPATH